# Patient Record
Sex: FEMALE | Race: WHITE | URBAN - METROPOLITAN AREA
[De-identification: names, ages, dates, MRNs, and addresses within clinical notes are randomized per-mention and may not be internally consistent; named-entity substitution may affect disease eponyms.]

---

## 2018-09-18 ENCOUNTER — HISTORICAL (OUTPATIENT)
Dept: ADMINISTRATIVE | Facility: HOSPITAL | Age: 33
End: 2018-09-18

## 2022-04-09 ENCOUNTER — HISTORICAL (OUTPATIENT)
Dept: ADMINISTRATIVE | Facility: HOSPITAL | Age: 37
End: 2022-04-09

## 2022-04-29 VITALS
HEIGHT: 62 IN | BODY MASS INDEX: 26.33 KG/M2 | DIASTOLIC BLOOD PRESSURE: 77 MMHG | HEIGHT: 62 IN | WEIGHT: 143.06 LBS | BODY MASS INDEX: 26.33 KG/M2 | DIASTOLIC BLOOD PRESSURE: 77 MMHG | SYSTOLIC BLOOD PRESSURE: 110 MMHG | WEIGHT: 143.06 LBS | SYSTOLIC BLOOD PRESSURE: 110 MMHG | OXYGEN SATURATION: 98 %

## 2022-05-02 NOTE — HISTORICAL [facility] CERNER
This is a historical note converted from Ry. Formatting and pictures may have been removed.  Please reference Ry for original formatting and attached multimedia. Chief Complaint  left shoulder pain x 8 months-no injury or previous treatment  History of Present Illness  This 32-year-old comes in complaining of left shoulder pain.? She has had pain since January.? She states that it is primarily?noted when she works out.? She points to her supraspinatus insertion as a source of pain.? She has difficulty with overhead activities but not sleeping.? She is right-hand dominant.? She has not noted loss of strength.  Review of Systems  Constitutional: No fever, weakness, or fatigue.  Ear/Nose/Mouth/Throat: No nasal congestion or sore throat.  Respiratory: No shortness of breath or cough.  Cardiovascular: No chest pain, palpitations, or peripheral edema.  Gastrointestinal: No nausea, vomiting, or abdominal pain.  Genitourinary: No dysuria.  Musculoskeletal: See current complaints  Integumentary: Negative.  Physical Exam  Vitals & Measurements  HR:?74(Peripheral)? BP:?110/77?  HT:?158?cm? HT:?158?cm? WT:?64.9?kg? WT:?64.9?kg? BMI:?26?  Physical examination shows that the patient is tender in the supraspinatus insertion.? She maintains full range of motion of her shoulder. ?She has no bicipital tendinitis.? She has minimal impingement symptoms with provocative testing.? She is motor and sensory intact.? He has no evidence of popping or crepitance in her shoulder. ?She has no evidence of instability.  ?  AP, axillary lateral, and scapular outlet view of the left shoulder shows that the patient has a type II acromion with no AC joint or subacromial spurring.? She has a small calcific deposit in her supraspinatus insertion.  Assessment/Plan  1.?Calcific tendinitis of left shoulder  ? The findings were reviewed with the patient and she expressed understanding.? Patient will be placed on oral corticosteroids for 1 week.? I  will see her back in 1 week for recheck.? She is instructed to call if she has any problems prior to her next appointment.? Side effects of steroids were discussed with the patient.  Ordered:  predniSONE, 10 mg = 1 tab(s), Oral, Daily, X 7 day(s), # 7 tab(s), 0 Refill(s), Pharmacy: [id number]  Clinic Follow up, *Est. 09/25/18 3:00:00 CDT, Order for future visit, Calcific tendinitis of left shoulder  Left supraspinatus tendinitis, [facility] [place]  Office/Outpatient Visit Level 3 [facility] [id number] [facility], Calcific tendinitis of left shoulder  Left supraspinatus tendinitis, [facility] [place], 09/18/18 14:58:00 CDT  ?  2.?Left supraspinatus tendinitis  Ordered:  predniSONE, 10 mg = 1 tab(s), Oral, Daily, X 7 day(s), # 7 tab(s), 0 Refill(s), Pharmacy: [id number]  Clinic Follow up, *Est. 09/25/18 3:00:00 CDT, Order for future visit, Calcific tendinitis of left shoulder  Left supraspinatus tendinitis, [facility]  Office/Outpatient Visit Level 3 [facility] [id number] [facility], Calcific tendinitis of left shoulder  Left supraspinatus tendinitis, [facility] [place], 09/18/18 14:58:00 CDT  ?  Left shoulder pain  Ordered:  XR Shoulder Left Minimum 2 Views, Routine, 09/18/18 14:38:00 CDT, Pain, None, Ambulatory, Rad Type, Left shoulder pain, Not Scheduled, 09/18/18 14:38:00 CDT  ?   Problem List/Past Medical History  Ongoing  Calcific tendinitis of left shoulder  Left supraspinatus tendinitis  Historical  No qualifying data  Procedure/Surgical History  toe surgery (1994)   Medications  prednisONE 10 mg oral tablet, 10 mg= 1 tab(s), Oral, Daily  TRINESSA TABLETS 28S, 1 tab(s), Oral, Daily  Allergies  No Known Medication Allergies  Social History  Alcohol  Current, Wine, 1-2 times per week, 11/28/2017  Substance Abuse  Never, 11/28/2017  Tobacco  Never smoker, 11/28/2017  Family History  Colon cancer: Father.  Health Maintenance  Health Maintenance  ???Pending?(in the next year)  ??? ??Due?  ??? ? ? ?ADL  Screening due??09/18/18??and every 1??year(s)  ??? ? ? ?Alcohol Misuse Screening due??09/18/18??and every 1??year(s)  ??? ? ? ?Depression Screening due??09/18/18??and every?  ??? ? ? ?Influenza Vaccine due??09/18/18??and every?  ??? ? ? ?Tetanus Vaccine due??09/18/18??and every 10??year(s)  ??? ??Due In Future?  ??? ? ? ?Cervical Cancer Screening not due until??08/22/19??and every 3??year(s)  ???Satisfied?(in the past 1 year)  ??? ??Satisfied?  ??? ? ? ?Blood Pressure Screening on??09/18/18.??Satisfied by Jeannie Schultz.  ??? ? ? ?Body Mass Index Check on??09/18/18.??Satisfied by Jeannie Schultz.  ??? ? ? ?Influenza Vaccine on??09/18/18.??Satisfied by Jeannie Schultz.  ??? ? ? ?Obesity Screening on??09/18/18.??Satisfied by Jeannie Schultz  ?  ?